# Patient Record
Sex: FEMALE | Race: WHITE | Employment: UNEMPLOYED | URBAN - METROPOLITAN AREA
[De-identification: names, ages, dates, MRNs, and addresses within clinical notes are randomized per-mention and may not be internally consistent; named-entity substitution may affect disease eponyms.]

---

## 2024-05-15 LAB
ANTIBODY SCREEN OB: NEGATIVE
RH FACTOR OB: POSITIVE
RH FACTOR OB: POSITIVE

## 2024-06-17 LAB
HEPATITIS B SURFACE ANTIGEN OB: NEGATIVE
HIV RESULT OB: NEGATIVE

## 2024-07-26 ENCOUNTER — HOSPITAL ENCOUNTER (OUTPATIENT)
Facility: HOSPITAL | Age: 39
Discharge: HOME | End: 2024-07-26
Attending: OBSTETRICS & GYNECOLOGY | Admitting: OBSTETRICS & GYNECOLOGY
Payer: COMMERCIAL

## 2024-07-26 VITALS
DIASTOLIC BLOOD PRESSURE: 66 MMHG | HEIGHT: 63 IN | RESPIRATION RATE: 16 BRPM | BODY MASS INDEX: 37.39 KG/M2 | SYSTOLIC BLOOD PRESSURE: 121 MMHG | WEIGHT: 211 LBS | HEART RATE: 81 BPM | TEMPERATURE: 99 F

## 2024-07-26 LAB
BILIRUBIN URINE: NEGATIVE
BLOOD URINE: NEGATIVE
CONTROL RUN WITHIN 24 HOURS?: YES
GLUCOSE URINE: NEGATIVE
KETONE URINE: 15
LEUKOCYTE ESTERASE URINE: NEGATIVE
NITRITE URINE: NEGATIVE
PH URINE: 7 (ref 5–8)
PROTEIN URINE: NEGATIVE
SPEC GRAVITY: 1.01 (ref 1–1.03)
URINE CLARITY: CLEAR
URINE COLOR: YELLOW
UROBILINOGEN URINE: 0.2

## 2024-07-26 PROCEDURE — 99203 OFFICE O/P NEW LOW 30 MIN: CPT

## 2024-07-26 PROCEDURE — 81002 URINALYSIS NONAUTO W/O SCOPE: CPT

## 2024-07-26 RX ORDER — CHOLECALCIFEROL (VITAMIN D3) 25 MCG
1 TABLET,CHEWABLE ORAL DAILY
COMMUNITY

## 2024-07-26 RX ORDER — FERROUS SULFATE 325(65) MG
325 TABLET, DELAYED RELEASE (ENTERIC COATED) ORAL EVERY OTHER DAY
COMMUNITY

## 2024-07-27 NOTE — NST
Nonstress Test   Patient: Mechelle James    Gestation: 27w2d    NST:       Variability: Moderate           Accelerations: Yes           Decelerations: Variable                        Uterine Irritability: Yes           Contractions: Not present                                        Acoustic Stimulator: No           Nonstress Test Interpretation: Appropriate for gestational age           Nonstress Test Second Interpretation: Appropriate for gestational age                     Additional Comments

## 2024-07-27 NOTE — PROGRESS NOTES
Pt is a 39 year old female admitted to TRG2/TRG2-A.     Chief Complaint   Patient presents with    Mva/fall/trauma      Pt is  27w2d intra-uterine pregnancy.  History obtained, consents signed. Oriented to room, staff, and plan of care. Pt c/o fall at approximately 1700. States she was playing with her kids at the playground, reached out to stop her daughter from falling and fell onto her shins. Denies abdominal trauma. Began having some mild, intermittent right-sided abdominal cramping around 1800. Denies vaginal bleeding, LOF. Bruising noted medially on left lower leg. Pt reports pain with weight bearing on that side. No other complaints.     EFM tested and applied, FHTs tracing and audible in 150s. Abdomen soft and non-tender.

## 2024-07-27 NOTE — DISCHARGE INSTRUCTIONS
Discharge Instructions    Diet: Regular  Activity: Normal activity         General Instructions    Call your OB doctor if: Fluid leaking from your vagina;Uterine contractions 10 minutes or closer for 1 to 2 hours;Uterine contractions increasing in intensity and frequency;Decrease in fetal movement;Vaginal bleeding;Temperature greater than 100F;Vaginal or rectal pressure     Follow up at next scheduled appointment

## 2024-07-27 NOTE — PROGRESS NOTES
Discharge instructions given and explained, pt verbalizes understanding and agrees. Aware she is to f/u at next scheduled appointment. Instructed to go to ED if swelling/pain in leg worsens. Pt discharged home via wc accompanied by family member.

## 2024-09-24 ENCOUNTER — HOSPITAL ENCOUNTER (OUTPATIENT)
Facility: HOSPITAL | Age: 39
Discharge: HOME | End: 2024-09-25
Attending: STUDENT IN AN ORGANIZED HEALTH CARE EDUCATION/TRAINING PROGRAM | Admitting: STUDENT IN AN ORGANIZED HEALTH CARE EDUCATION/TRAINING PROGRAM
Payer: COMMERCIAL

## 2024-09-24 VITALS — TEMPERATURE: 98 F | SYSTOLIC BLOOD PRESSURE: 112 MMHG | DIASTOLIC BLOOD PRESSURE: 63 MMHG | HEART RATE: 74 BPM

## 2024-09-24 LAB
APTT PPP: 26.4 SECONDS (ref 23–36)
BASOPHILS # BLD AUTO: 0.03 X10(3) UL (ref 0–0.2)
BASOPHILS NFR BLD AUTO: 0.2 %
EOSINOPHIL # BLD AUTO: 0.11 X10(3) UL (ref 0–0.7)
EOSINOPHIL NFR BLD AUTO: 0.9 %
ERYTHROCYTE [DISTWIDTH] IN BLOOD BY AUTOMATED COUNT: 13.3 %
FIBRINOGEN PPP-MCNC: 400 MG/DL (ref 200–480)
HCT VFR BLD AUTO: 32.2 %
HGB BLD-MCNC: 11.5 G/DL
IMM GRANULOCYTES # BLD AUTO: 0.08 X10(3) UL (ref 0–1)
IMM GRANULOCYTES NFR BLD: 0.7 %
INR BLD: 1.03 (ref 0.8–1.2)
LYMPHOCYTES # BLD AUTO: 1.85 X10(3) UL (ref 1–4)
LYMPHOCYTES NFR BLD AUTO: 15.2 %
MCH RBC QN AUTO: 30.8 PG (ref 26–34)
MCHC RBC AUTO-ENTMCNC: 35.7 G/DL (ref 31–37)
MCV RBC AUTO: 86.3 FL
MONOCYTES # BLD AUTO: 0.68 X10(3) UL (ref 0.1–1)
MONOCYTES NFR BLD AUTO: 5.6 %
NEUTROPHILS # BLD AUTO: 9.41 X10 (3) UL (ref 1.5–7.7)
NEUTROPHILS # BLD AUTO: 9.41 X10(3) UL (ref 1.5–7.7)
NEUTROPHILS NFR BLD AUTO: 77.4 %
PLATELET # BLD AUTO: 192 10(3)UL (ref 150–450)
PROTHROMBIN TIME: 13.5 SECONDS (ref 11.6–14.8)
RBC # BLD AUTO: 3.73 X10(6)UL
WBC # BLD AUTO: 12.2 X10(3) UL (ref 4–11)

## 2024-09-24 PROCEDURE — 85384 FIBRINOGEN ACTIVITY: CPT | Performed by: STUDENT IN AN ORGANIZED HEALTH CARE EDUCATION/TRAINING PROGRAM

## 2024-09-24 PROCEDURE — 85460 HEMOGLOBIN FETAL: CPT | Performed by: STUDENT IN AN ORGANIZED HEALTH CARE EDUCATION/TRAINING PROGRAM

## 2024-09-24 PROCEDURE — 85610 PROTHROMBIN TIME: CPT | Performed by: STUDENT IN AN ORGANIZED HEALTH CARE EDUCATION/TRAINING PROGRAM

## 2024-09-24 PROCEDURE — 36415 COLL VENOUS BLD VENIPUNCTURE: CPT

## 2024-09-24 PROCEDURE — 85730 THROMBOPLASTIN TIME PARTIAL: CPT | Performed by: STUDENT IN AN ORGANIZED HEALTH CARE EDUCATION/TRAINING PROGRAM

## 2024-09-24 PROCEDURE — 85025 COMPLETE CBC W/AUTO DIFF WBC: CPT | Performed by: STUDENT IN AN ORGANIZED HEALTH CARE EDUCATION/TRAINING PROGRAM

## 2024-09-24 PROCEDURE — 86701 HIV-1ANTIBODY: CPT | Performed by: STUDENT IN AN ORGANIZED HEALTH CARE EDUCATION/TRAINING PROGRAM

## 2024-09-25 LAB
HIV 1+2 AB+HIV1 P24 AG SERPL QL IA: NONREACTIVE
KLEIHAUER BETKE RESULT: NEGATIVE

## 2024-09-25 PROCEDURE — 59025 FETAL NON-STRESS TEST: CPT

## 2024-09-25 PROCEDURE — 99213 OFFICE O/P EST LOW 20 MIN: CPT

## 2024-09-25 NOTE — PROGRESS NOTES
39 year old  at 35.6 weeks gestation presents to triage with c/o falling earlier tonight.  Pt states that she initially fell on her knees but then hit her abdomen when she fell forward.  Pt reports good fetal movement and denies vaginal bleeding or leaking of fluid.  Pt denies any contractions other than usual sydney hale.  Plan of care for triage discussed with patient.  Pt with band aids on elbows and knees but denies any need for pain medication at this time.

## 2024-09-25 NOTE — NST
Nonstress Test   Patient: Mechelle James    Gestation: 36w0d    NST:       Variability: Moderate           Accelerations: Yes           Decelerations: None            Baseline: 130 BPM           Uterine Irritability: Yes           Contractions: Irregular                                        Acoustic Stimulator: No           Nonstress Test Interpretation: Reactive                                 Additional Comments

## 2024-09-25 NOTE — DISCHARGE INSTRUCTIONS
Discharge Instructions    Diet: regular  Activity: Normal activity         General Instructions    Call your OB doctor if: Fluid leaking from your vagina;Uterine contractions 10 minutes or closer for 1 to 2 hours;Uterine contractions increasing in intensity and frequency;Decrease in fetal movement;Vaginal bleeding;Temperature greater than 100F;Vaginal or rectal pressure  Early labor comfort measures: Relax, sleep, take a warm bath or shower for 30 minutes or less;Drink fluids and eat small light meals;Take a walk      Follow up with your next scheduled visit  Call with further questions or concerns

## 2024-09-25 NOTE — PROGRESS NOTES
Pt given both written and verbal discharge instructions.  Pt verbalized understanding and is in agreement of plan of care.  Pt discharged home with spouse in stable condition.  Pt has appt today at 10am with her OB and will return for that.

## 2024-10-22 ENCOUNTER — HOSPITAL ENCOUNTER (INPATIENT)
Facility: HOSPITAL | Age: 39
LOS: 1 days | Discharge: HOME | End: 2024-10-23
Attending: STUDENT IN AN ORGANIZED HEALTH CARE EDUCATION/TRAINING PROGRAM | Admitting: STUDENT IN AN ORGANIZED HEALTH CARE EDUCATION/TRAINING PROGRAM
Payer: COMMERCIAL

## 2024-10-22 ENCOUNTER — ANESTHESIA EVENT (OUTPATIENT)
Dept: OBGYN UNIT | Facility: HOSPITAL | Age: 39
End: 2024-10-22
Payer: COMMERCIAL

## 2024-10-22 ENCOUNTER — ANESTHESIA (OUTPATIENT)
Dept: OBGYN UNIT | Facility: HOSPITAL | Age: 39
End: 2024-10-22
Payer: COMMERCIAL

## 2024-10-22 ENCOUNTER — APPOINTMENT (OUTPATIENT)
Dept: OBGYN CLINIC | Facility: HOSPITAL | Age: 39
End: 2024-10-22
Payer: COMMERCIAL

## 2024-10-22 PROBLEM — Z34.90 PREGNANCY (HCC): Status: ACTIVE | Noted: 2024-10-22

## 2024-10-22 LAB
ANION GAP SERPL CALC-SCNC: 9 MMOL/L (ref 0–18)
ANTIBODY SCREEN: NEGATIVE
BASOPHILS # BLD AUTO: 0.03 X10(3) UL (ref 0–0.2)
BASOPHILS NFR BLD AUTO: 0.3 %
BUN BLD-MCNC: 6 MG/DL (ref 9–23)
CALCIUM BLD-MCNC: 9.1 MG/DL (ref 8.7–10.4)
CHLORIDE SERPL-SCNC: 108 MMOL/L (ref 98–112)
CO2 SERPL-SCNC: 19 MMOL/L (ref 21–32)
CREAT BLD-MCNC: 0.53 MG/DL
EGFRCR SERPLBLD CKD-EPI 2021: 121 ML/MIN/1.73M2 (ref 60–?)
EOSINOPHIL # BLD AUTO: 0.06 X10(3) UL (ref 0–0.7)
EOSINOPHIL NFR BLD AUTO: 0.6 %
ERYTHROCYTE [DISTWIDTH] IN BLOOD BY AUTOMATED COUNT: 13.1 %
GLUCOSE BLD-MCNC: 145 MG/DL (ref 70–99)
GLUCOSE BLD-MCNC: 65 MG/DL (ref 70–99)
GLUCOSE BLD-MCNC: 97 MG/DL (ref 70–99)
HCT VFR BLD AUTO: 35.2 %
HGB BLD-MCNC: 12.4 G/DL
IMM GRANULOCYTES # BLD AUTO: 0.07 X10(3) UL (ref 0–1)
IMM GRANULOCYTES NFR BLD: 0.6 %
LYMPHOCYTES # BLD AUTO: 1.38 X10(3) UL (ref 1–4)
LYMPHOCYTES NFR BLD AUTO: 12.7 %
MCH RBC QN AUTO: 30.7 PG (ref 26–34)
MCHC RBC AUTO-ENTMCNC: 35.2 G/DL (ref 31–37)
MCV RBC AUTO: 87.1 FL
MONOCYTES # BLD AUTO: 0.55 X10(3) UL (ref 0.1–1)
MONOCYTES NFR BLD AUTO: 5.1 %
NEUTROPHILS # BLD AUTO: 8.74 X10 (3) UL (ref 1.5–7.7)
NEUTROPHILS # BLD AUTO: 8.74 X10(3) UL (ref 1.5–7.7)
NEUTROPHILS NFR BLD AUTO: 80.7 %
OSMOLALITY SERPL CALC.SUM OF ELEC: 282 MOSM/KG (ref 275–295)
PLATELET # BLD AUTO: 190 10(3)UL (ref 150–450)
POTASSIUM SERPL-SCNC: 3.4 MMOL/L (ref 3.5–5.1)
RBC # BLD AUTO: 4.04 X10(6)UL
RH BLOOD TYPE: POSITIVE
RH BLOOD TYPE: POSITIVE
SODIUM SERPL-SCNC: 136 MMOL/L (ref 136–145)
T PALLIDUM AB SER QL IA: NONREACTIVE
WBC # BLD AUTO: 10.8 X10(3) UL (ref 4–11)

## 2024-10-22 PROCEDURE — 82962 GLUCOSE BLOOD TEST: CPT

## 2024-10-22 PROCEDURE — 86780 TREPONEMA PALLIDUM: CPT | Performed by: STUDENT IN AN ORGANIZED HEALTH CARE EDUCATION/TRAINING PROGRAM

## 2024-10-22 PROCEDURE — 86901 BLOOD TYPING SEROLOGIC RH(D): CPT | Performed by: STUDENT IN AN ORGANIZED HEALTH CARE EDUCATION/TRAINING PROGRAM

## 2024-10-22 PROCEDURE — 0KQM0ZZ REPAIR PERINEUM MUSCLE, OPEN APPROACH: ICD-10-PCS | Performed by: STUDENT IN AN ORGANIZED HEALTH CARE EDUCATION/TRAINING PROGRAM

## 2024-10-22 PROCEDURE — 86900 BLOOD TYPING SEROLOGIC ABO: CPT | Performed by: STUDENT IN AN ORGANIZED HEALTH CARE EDUCATION/TRAINING PROGRAM

## 2024-10-22 PROCEDURE — 10907ZC DRAINAGE OF AMNIOTIC FLUID, THERAPEUTIC FROM PRODUCTS OF CONCEPTION, VIA NATURAL OR ARTIFICIAL OPENING: ICD-10-PCS | Performed by: STUDENT IN AN ORGANIZED HEALTH CARE EDUCATION/TRAINING PROGRAM

## 2024-10-22 PROCEDURE — 85025 COMPLETE CBC W/AUTO DIFF WBC: CPT | Performed by: STUDENT IN AN ORGANIZED HEALTH CARE EDUCATION/TRAINING PROGRAM

## 2024-10-22 PROCEDURE — 86850 RBC ANTIBODY SCREEN: CPT | Performed by: STUDENT IN AN ORGANIZED HEALTH CARE EDUCATION/TRAINING PROGRAM

## 2024-10-22 PROCEDURE — 3E033VJ INTRODUCTION OF OTHER HORMONE INTO PERIPHERAL VEIN, PERCUTANEOUS APPROACH: ICD-10-PCS | Performed by: STUDENT IN AN ORGANIZED HEALTH CARE EDUCATION/TRAINING PROGRAM

## 2024-10-22 PROCEDURE — 80048 BASIC METABOLIC PNL TOTAL CA: CPT | Performed by: STUDENT IN AN ORGANIZED HEALTH CARE EDUCATION/TRAINING PROGRAM

## 2024-10-22 RX ORDER — AMMONIA INHALANTS 0.04 G/.3ML
0.3 INHALANT RESPIRATORY (INHALATION) AS NEEDED
Status: DISCONTINUED | OUTPATIENT
Start: 2024-10-22 | End: 2024-10-23

## 2024-10-22 RX ORDER — IBUPROFEN 600 MG/1
600 TABLET, FILM COATED ORAL ONCE AS NEEDED
Status: DISCONTINUED | OUTPATIENT
Start: 2024-10-22 | End: 2024-10-22

## 2024-10-22 RX ORDER — IBUPROFEN 600 MG/1
600 TABLET, FILM COATED ORAL EVERY 6 HOURS
Status: DISCONTINUED | OUTPATIENT
Start: 2024-10-22 | End: 2024-10-23

## 2024-10-22 RX ORDER — DOCUSATE SODIUM 100 MG/1
100 CAPSULE, LIQUID FILLED ORAL
Status: DISCONTINUED | OUTPATIENT
Start: 2024-10-22 | End: 2024-10-23

## 2024-10-22 RX ORDER — BISACODYL 10 MG
10 SUPPOSITORY, RECTAL RECTAL ONCE AS NEEDED
Status: DISCONTINUED | OUTPATIENT
Start: 2024-10-22 | End: 2024-10-23

## 2024-10-22 RX ORDER — BUPIVACAINE HYDROCHLORIDE 2.5 MG/ML
30 INJECTION, SOLUTION EPIDURAL; INFILTRATION; INTRACAUDAL AS NEEDED
Status: DISCONTINUED | OUTPATIENT
Start: 2024-10-22 | End: 2024-10-22

## 2024-10-22 RX ORDER — TERBUTALINE SULFATE 1 MG/ML
0.25 INJECTION, SOLUTION SUBCUTANEOUS AS NEEDED
Status: DISCONTINUED | OUTPATIENT
Start: 2024-10-22 | End: 2024-10-22

## 2024-10-22 RX ORDER — LIDOCAINE HYDROCHLORIDE AND EPINEPHRINE 15; 5 MG/ML; UG/ML
INJECTION, SOLUTION EPIDURAL AS NEEDED
Status: DISCONTINUED | OUTPATIENT
Start: 2024-10-22 | End: 2024-10-22 | Stop reason: SURG

## 2024-10-22 RX ORDER — CITRIC ACID/SODIUM CITRATE 334-500MG
30 SOLUTION, ORAL ORAL AS NEEDED
Status: DISCONTINUED | OUTPATIENT
Start: 2024-10-22 | End: 2024-10-22

## 2024-10-22 RX ORDER — SODIUM CHLORIDE 9 MG/ML
10 INJECTION, SOLUTION INTRAMUSCULAR; INTRAVENOUS; SUBCUTANEOUS AS NEEDED
Status: DISCONTINUED | OUTPATIENT
Start: 2024-10-22 | End: 2024-10-22

## 2024-10-22 RX ORDER — ACETAMINOPHEN 500 MG
1000 TABLET ORAL EVERY 6 HOURS PRN
Status: DISCONTINUED | OUTPATIENT
Start: 2024-10-22 | End: 2024-10-22

## 2024-10-22 RX ORDER — ACETAMINOPHEN 500 MG
500 TABLET ORAL EVERY 6 HOURS PRN
Status: DISCONTINUED | OUTPATIENT
Start: 2024-10-22 | End: 2024-10-22

## 2024-10-22 RX ORDER — ACETAMINOPHEN 500 MG
1000 TABLET ORAL EVERY 6 HOURS PRN
Status: DISCONTINUED | OUTPATIENT
Start: 2024-10-22 | End: 2024-10-23

## 2024-10-22 RX ORDER — SIMETHICONE 80 MG
80 TABLET,CHEWABLE ORAL 3 TIMES DAILY PRN
Status: DISCONTINUED | OUTPATIENT
Start: 2024-10-22 | End: 2024-10-23

## 2024-10-22 RX ORDER — ACETAMINOPHEN 500 MG
500 TABLET ORAL EVERY 6 HOURS PRN
Status: DISCONTINUED | OUTPATIENT
Start: 2024-10-22 | End: 2024-10-23

## 2024-10-22 RX ORDER — LIDOCAINE HYDROCHLORIDE 20 MG/ML
5 INJECTION, SOLUTION EPIDURAL; INFILTRATION; INTRACAUDAL; PERINEURAL AS NEEDED
Status: DISCONTINUED | OUTPATIENT
Start: 2024-10-22 | End: 2024-10-22

## 2024-10-22 RX ORDER — SODIUM CHLORIDE, SODIUM LACTATE, POTASSIUM CHLORIDE, CALCIUM CHLORIDE 600; 310; 30; 20 MG/100ML; MG/100ML; MG/100ML; MG/100ML
INJECTION, SOLUTION INTRAVENOUS CONTINUOUS
Status: DISCONTINUED | OUTPATIENT
Start: 2024-10-22 | End: 2024-10-22

## 2024-10-22 RX ORDER — BUPIVACAINE HCL/0.9 % NACL/PF 0.25 %
5 PLASTIC BAG, INJECTION (ML) EPIDURAL AS NEEDED
Status: DISCONTINUED | OUTPATIENT
Start: 2024-10-22 | End: 2024-10-22

## 2024-10-22 RX ORDER — LIDOCAINE HYDROCHLORIDE AND EPINEPHRINE 15; 5 MG/ML; UG/ML
5 INJECTION, SOLUTION EPIDURAL AS NEEDED
Status: DISCONTINUED | OUTPATIENT
Start: 2024-10-22 | End: 2024-10-22

## 2024-10-22 RX ORDER — ONDANSETRON 2 MG/ML
4 INJECTION INTRAMUSCULAR; INTRAVENOUS EVERY 6 HOURS PRN
Status: DISCONTINUED | OUTPATIENT
Start: 2024-10-22 | End: 2024-10-22

## 2024-10-22 RX ORDER — DEXTROSE, SODIUM CHLORIDE, SODIUM LACTATE, POTASSIUM CHLORIDE, AND CALCIUM CHLORIDE 5; .6; .31; .03; .02 G/100ML; G/100ML; G/100ML; G/100ML; G/100ML
INJECTION, SOLUTION INTRAVENOUS AS NEEDED
Status: DISCONTINUED | OUTPATIENT
Start: 2024-10-22 | End: 2024-10-22

## 2024-10-22 RX ORDER — NALBUPHINE HYDROCHLORIDE 10 MG/ML
2.5 INJECTION INTRAMUSCULAR; INTRAVENOUS; SUBCUTANEOUS
Status: DISCONTINUED | OUTPATIENT
Start: 2024-10-22 | End: 2024-10-22

## 2024-10-22 RX ADMIN — LIDOCAINE HYDROCHLORIDE AND EPINEPHRINE 5 ML: 15; 5 INJECTION, SOLUTION EPIDURAL at 15:19:00

## 2024-10-22 NOTE — ANESTHESIA PROCEDURE NOTES
Labor Analgesia    Date/Time: 10/22/2024 3:13 PM    Performed by: Ilana Han MD  Authorized by: Ilana Han MD      General Information and Staff    Start Time:  10/22/2024 3:13 PM  End Time:  10/22/2024 3:19 PM  Anesthesiologist:  Ilana Han MD  Performed by:  Anesthesiologist  Patient Location:  OB  Site Identification: surface landmarks    Reason for Block: labor epidural    Preanesthetic Checklist: patient identified, IV checked, risks and benefits discussed, monitors and equipment checked, pre-op evaluation, timeout performed, IV bolus, anesthesia consent and sterile technique used      Procedure Details    Patient Position:  Sitting  Prep: ChloraPrep    Monitoring:  Heart rate and continuous pulse ox  Approach:  Midline    Epidural Needle    Injection Technique:   Needle Type:  Tuohy  Needle Gauge:  17 G  Needle Length:  3.375 in  Needle Insertion Depth:  5    Spinal Needle      Catheter    Catheter Type:  End hole  Catheter Size:  19 G  Catheter at Skin Depth:  12  Test Dose:  Negative    Assessment    Sensory Level:  T10    Additional Comments     Patient has history of failed epidurals x 3  One attempt, uncomplicated, did take extra time to get comfortable but comfort achieved

## 2024-10-22 NOTE — ANESTHESIA PREPROCEDURE EVALUATION
PRE-OP EVALUATION    Patient Name: Mechelle James    Admit Diagnosis: Pregnancy (HCC) [Z34.90]    Pre-op Diagnosis: * No pre-op diagnosis entered *        Anesthesia Procedure: LABOR ANALGESIA    * No surgeons found in log *    Pre-op vitals reviewed.  Temp: 98.4 °F (36.9 °C)  Pulse: 70  Resp: 16  BP: 129/79     Body mass index is 37.38 kg/m².    Current medications reviewed.  Hospital Medications:  • lactated ringers infusion   Intravenous Continuous   • dextrose in lactated ringers 5% infusion   Intravenous PRN   • lactated ringers IV bolus 500 mL  500 mL Intravenous PRN   • acetaminophen (Tylenol Extra Strength) tab 500 mg  500 mg Oral Q6H PRN   • acetaminophen (Tylenol Extra Strength) tab 1,000 mg  1,000 mg Oral Q6H PRN   • ibuprofen (Motrin) tab 600 mg  600 mg Oral Once PRN   • ondansetron (Zofran) 4 MG/2ML injection 4 mg  4 mg Intravenous Q6H PRN   • oxyTOCIN in sodium chloride 0.9% (Pitocin) 30 Units/500mL infusion premix  62.5-900 nuzhat-units/min Intravenous Continuous   • terbutaline (Brethine) 1 MG/ML injection 0.25 mg  0.25 mg Subcutaneous PRN   • sodium citrate-citric acid (Bicitra) 500-334 MG/5ML oral solution 30 mL  30 mL Oral PRN   • oxyTOCIN in sodium chloride 0.9% (Pitocin) 30 Units/500mL infusion premix  0.5-20 nuzhat-units/min Intravenous Continuous   • lactated ringers IV bolus 1,000 mL  1,000 mL Intravenous Once   • fentaNYL-bupivacaine 2 mcg/mL-0.125% in sodium chloride 0.9% 100 mL EPIDURAL infusion premix  12 mL/hr Epidural Continuous   • fentaNYL (Sublimaze) 50 mcg/mL injection 100 mcg  100 mcg Epidural Once   • lidocaine 1.5%-EPINEPHrine 1:200,000 (Xylocaine-Epinephrine) injection  5 mL Injection PRN   • bupivacaine PF (Marcaine) 0.25% injection  30 mL Injection PRN   • lidocaine PF (Xylocaine-MPF) 2% injection  5 mL Injection PRN   • sodium chloride 0.9% PF injection 10 mL  10 mL Injection PRN   • EPHEDrine (PF) 25 MG/5 ML injection 5 mg  5 mg Intravenous PRN   • nalbuphine (Nubain) 10  mg/mL injection 2.5 mg  2.5 mg Intravenous Q15 Min PRN       Outpatient Medications:   Prescriptions Prior to Admission[1]    Allergies: Patient has no known allergies.      Anesthesia Evaluation    Patient summary reviewed.    Anesthetic Complications  (-) history of anesthetic complications         GI/Hepatic/Renal    Negative GI/hepatic/renal ROS.                             Cardiovascular        Exercise tolerance: good     MET: >4      (+) hypertension                                     Endo/Other      (+) diabetes  gestational,                          Pulmonary    Negative pulmonary ROS.                       Neuro/Psych    Negative neuro/psych ROS.                              Past Surgical History:   Procedure Laterality Date   • Anesth,biceps tendon repair Right 2023   • Cholecystectomy  2016   • Excis uterine fibroid,abd apprch  2015   • Knee arthroscopy Bilateral     Age 16   • Repair anter tibial tubercle Bilateral     Age 18   • Rotator cuff repair Right 2023     Social History     Socioeconomic History   • Marital status: Single   Tobacco Use   • Smoking status: Never   • Smokeless tobacco: Never   Vaping Use   • Vaping status: Never Used   Substance and Sexual Activity   • Alcohol use: Not Currently   • Drug use: Never     History   Drug Use Unknown     Available pre-op labs reviewed.  Lab Results   Component Value Date    WBC 10.8 10/22/2024    RBC 4.04 10/22/2024    HGB 12.4 10/22/2024    HCT 35.2 10/22/2024    MCV 87.1 10/22/2024    MCH 30.7 10/22/2024    MCHC 35.2 10/22/2024    RDW 13.1 10/22/2024    .0 10/22/2024     Lab Results   Component Value Date     10/22/2024    K 3.4 (L) 10/22/2024     10/22/2024    CO2 19.0 (L) 10/22/2024    BUN 6 (L) 10/22/2024    CREATSERUM 0.53 (L) 10/22/2024     (H) 10/22/2024    CA 9.1 10/22/2024            Airway      Mallampati: II  Mouth opening: >3 FB  TM distance: > 6 cm  Neck ROM: full Cardiovascular    Cardiovascular exam  normal.         Dental    Dentition appears grossly intact         Pulmonary    Pulmonary exam normal.                 Other findings        ASA: 3   Plan: epidural   patient meets guidelines.    Post-procedure pain management plan discussed with surgeon and patient.      Plan/risks discussed with: patient            Present on Admission:  **None**             [1]   Medications Prior to Admission   Medication Sig Dispense Refill Last Dose/Taking   • docusate sodium 50 MG Oral Cap Take 1 capsule (50 mg total) by mouth 2 (two) times daily.   10/21/2024   • prenatal vitamin with DHA 27-0.8-228 MG Oral Cap Take 1 capsule by mouth daily.   10/21/2024   • ferrous sulfate 325 (65 FE) MG Oral Tab EC Take 1 tablet (325 mg total) by mouth every other day.   10/21/2024

## 2024-10-22 NOTE — PLAN OF CARE
Problem: BIRTH - VAGINAL/ SECTION  Goal: Fetal and maternal status remain reassuring during the birth process  Description: INTERVENTIONS:  - Monitor vital signs  - Monitor fetal heart rate  - Monitor uterine activity  - Monitor labor progression (vaginal delivery)  - DVT prophylaxis (C/S delivery)  - Surgical antibiotic prophylaxis (C/S delivery)  Outcome: Completed     Problem: PAIN - ADULT  Goal: Verbalizes/displays adequate comfort level or patient's stated pain goal  Description: INTERVENTIONS:  - Encourage pt to monitor pain and request assistance  - Assess pain using appropriate pain scale  - Administer analgesics based on type and severity of pain and evaluate response  - Implement non-pharmacological measures as appropriate and evaluate response  - Consider cultural and social influences on pain and pain management  - Manage/alleviate anxiety  - Utilize distraction and/or relaxation techniques  - Monitor for opioid side effects  - Notify MD/LIP if interventions unsuccessful or patient reports new pain  - Anticipate increased pain with activity and pre-medicate as appropriate  Outcome: Completed     Problem: ANXIETY  Goal: Will report anxiety at manageable levels  Description: INTERVENTIONS:  - Administer medication as ordered  - Teach and rehearse alternative coping skills  - Provide emotional support with 1:1 interaction with staff  Outcome: Completed     Problem: Patient/Family Goals  Goal: Patient/Family Long Term Goal  Description: Patient's Long Term Goal:     Interventions:Uncomplicated vaginal delivery  -   - See additional Care Plan goals for specific interventions  Outcome: Completed  Goal: Patient/Family Short Term Goal  Description: Patient's Short Term Goal: Adequate pain control    Interventions:   -   - See additional Care Plan goals for specific interventions  Outcome: Completed

## 2024-10-22 NOTE — H&P
[facility]  History & Physical    Mechelle James Patient Status:  Inpatient    1985 MRN [id number]   Location [facility] LABOR & DELIVERY Attending Candy Siu MD   Hosp Day # 0 PCP No primary care provider on file.     SUBJECTIVE:    Mechelle James is a 39 year old  female at 39+6 weeks gestation who is being admitted for induction of labor for ama/a1gdm.  Her current obstetrical history is significant for     1) AMA and obseity  -NST/JENNIFER weekly  2) GDM A1  3) H/o preeclampsia with first pregnancy  4) GBS neg     Obstetric History:   OB History    Para Term  AB Living   4 3 3     3   SAB IAB Ectopic Multiple Live Births           3      # Outcome Date GA Lbr Gary/2nd Weight Sex Type Anes PTL Lv   4 Current            3 Term 10/28/19 39w0d   F NORMAL SPONT   LILLIAM      Complications: Other - see comments   2 Term 18 39w0d   F NORMAL SPONT   LILLIAM   1 Term 07/10/16 41w0d   F NORMAL SPONT   LILLIAM      Complications: Preeclampsia     Past Medical History:   Past Medical History:    Endometriosis    Gestational diabetes (HCC)    Preeclampsia (HCC)    Pregnancy-induced hypertension (HCC)     Past Social History:   Past Surgical History:   Procedure Laterality Date    Anesth,biceps tendon repair Right     Cholecystectomy  2016    Excis uterine fibroid,abd apprch  2015    Knee arthroscopy Bilateral     Age 16    Repair anter tibial tubercle Bilateral     Age 18    Rotator cuff repair Right      Family History: History reviewed. No pertinent family history.  Social History:   Social History     Tobacco Use    Smoking status: Never    Smokeless tobacco: Never   Substance Use Topics    Alcohol use: Not Currently       Home Meds: Prescriptions Prior to Admission[1]  Allergies: Allergies[2]    OBJECTIVE:    Temp:  [98 °F (36.7 °C)] 98 °F (36.7 °C)  Pulse:  [70] 70  Resp:  [17] 17  BP: (134)/(81) 134/81    Lungs:   nonlabored   Heart:   Regular rate   Abdomen: +gravid    Fetal Surveillance:  145/mod/+accels/-decels   TOCO irregular      Cervix: In clinic 3     Lab Review:  Lab Results   Component Value Date    WBC 10.8 10/22/2024    HGB 12.4 10/22/2024    HCT 35.2 10/22/2024    .0 10/22/2024    CREATSERUM 0.53 10/22/2024    BUN 6 10/22/2024     10/22/2024    K 3.4 10/22/2024     10/22/2024    CO2 19.0 10/22/2024     10/22/2024    CA 9.1 10/22/2024       GBS negative       ASSESSMENT:  Mechelle James is a 39 year old  female at 39+6 weeks gestation who is being admitted for induction of labor for ama/a1gdm.       PLAN:  -admit to labor and delivery  -IVF  -cbc, T&S, admit accu 97  -labor: on pitocine  -GBS negative  -epidural PRN      Candy Siu MD  10/22/2024  10:52 AM         [1]   Medications Prior to Admission   Medication Sig Dispense Refill Last Dose/Taking    docusate sodium 50 MG Oral Cap Take 1 capsule (50 mg total) by mouth 2 (two) times daily.   10/21/2024    prenatal vitamin with DHA 27-0.8-228 MG Oral Cap Take 1 capsule by mouth daily.   10/21/2024    ferrous sulfate 325 (65 FE) MG Oral Tab EC Take 1 tablet (325 mg total) by mouth every other day.   10/21/2024   [2] No Known Allergies

## 2024-10-22 NOTE — PLAN OF CARE
Problem: BIRTH - VAGINAL/ SECTION  Goal: Fetal and maternal status remain reassuring during the birth process  Description: INTERVENTIONS:  - Monitor vital signs  - Monitor fetal heart rate  - Monitor uterine activity  - Monitor labor progression (vaginal delivery)  - DVT prophylaxis (C/S delivery)  - Surgical antibiotic prophylaxis (C/S delivery)  10/22/2024 1018 by Tiffany Waters RN  Outcome: Progressing  10/22/2024 1017 by Tiffany Waters RN  Outcome: Progressing     Problem: PAIN - ADULT  Goal: Verbalizes/displays adequate comfort level or patient's stated pain goal  Description: INTERVENTIONS:  - Encourage pt to monitor pain and request assistance  - Assess pain using appropriate pain scale  - Administer analgesics based on type and severity of pain and evaluate response  - Implement non-pharmacological measures as appropriate and evaluate response  - Consider cultural and social influences on pain and pain management  - Manage/alleviate anxiety  - Utilize distraction and/or relaxation techniques  - Monitor for opioid side effects  - Notify MD/LIP if interventions unsuccessful or patient reports new pain  - Anticipate increased pain with activity and pre-medicate as appropriate  10/22/2024 1018 by Tiffany Waters RN  Outcome: Progressing  10/22/2024 1017 by Tiffany Waters RN  Outcome: Progressing     Problem: ANXIETY  Goal: Will report anxiety at manageable levels  Description: INTERVENTIONS:  - Administer medication as ordered  - Teach and rehearse alternative coping skills  - Provide emotional support with 1:1 interaction with staff  10/22/2024 1018 by Tiffany Waters RN  Outcome: Progressing  10/22/2024 1017 by Tiffany Waters RN  Outcome: Progressing     Problem: Patient/Family Goals  Goal: Patient/Family Long Term Goal  Description: Patient's Long Term Goal:     Interventions:Uncomplicated vaginal delivery  -   - See additional Care Plan goals for specific  interventions  10/22/2024 1018 by Tiffany Waters RN  Outcome: Progressing  10/22/2024 1017 by Tiffany Waters RN  Outcome: Progressing  Goal: Patient/Family Short Term Goal  Description: Patient's Short Term Goal: Adequate pain control    Interventions:   -   - See additional Care Plan goals for specific interventions  10/22/2024 1018 by Tiffany Waters RN  Outcome: Progressing  10/22/2024 1017 by Tiffany Waters RN  Outcome: Progressing

## 2024-10-22 NOTE — PROGRESS NOTES
Pt admitted at 0810. In to see pt at  0820, pt standing at bedside, POC discussed, pt requests for labor and delivery, pain options, delivery.   All questions answered.

## 2024-10-22 NOTE — PROGRESS NOTES
Pt is a 39 year old female admitted to 108/108-A.     Chief Complaint   Patient presents with    Scheduled Induction      Pt is  39w6d intra-uterine pregnancy.  History obtained, consents signed. Oriented to room, staff, and plan of care.

## 2024-10-22 NOTE — L&D DELIVERY NOTE
Jacob Girl [[id number]]      Labor Events     labor?: No   steroids?: None  Rupture date/time: 10/22/2024 1244     Rupture type: AROM  Fluid color: Clear  Labor type: Induced Onset of Labor  Induction: Oxytocin  Indications for induction: IDDM/GDDM  Intrapartum & labor complications: Variable decelerations       Labor Event Times    Labor onset date/time: 10/22/2024 1224  Dilation complete date/time: 10/22/2024 1803  Start pushing date/time: 10/22/2024 1810        Presentation    Presentation: Vertex  Position: Occiput Anterior       Operative Delivery    Operative Vaginal Delivery: N/A                Shoulder Dystocia    Shoulder Dystocia: N/A       Anesthesia    Method: Epidural               Delivery      Head delivery date/time: 10/22/2024 18:11:27   Delivery date/time:  10/22/24 18:11:00   Delivery type: Normal spontaneous vaginal delivery    Details:     Delivery location: delivery room       Delivery Providers    Delivering Clinician: Candy Siu MD   Delivery personnel:  Provider Role   Roya Jaime, RN Baby Nurse   Tiffany Waters, RN Delivery Nurse             Cord    Vessels: 3 Vessels  Complications: Nuchal  # of loops: 1  Timed cord clamping: Yes  Time in sec: 30  Cord blood disposition: to lab  Gases sent?: No       Resuscitation    Method: None        Measurements      Weight: 3690 g 8 lb 2.2 oz Length: 48.9 cm     Head circum.: 34.5 cm Chest circum.: 34 cm      Abdominal circum.: 34.5 cm           Placenta    Date/time: 10/22/2024 1814  Removal: Spontaneous  Appearance: Intact  Disposition: held for future pathology       Apgars    Living status: Living   Apgar Scoring Key:    0 1 2    Skin color Blue or pale Acrocyanotic Completely pink    Heart rate Absent <100 bpm >100 bpm    Reflex irritability No response Grimace Cry or active withdrawal    Muscle tone Limp Some flexion Active motion    Respiratory effort Absent Weak cry;  hypoventilation Good, crying              1 Minute:  5 Minute:  10 Minute:  15 Minute:  20 Minute:      Skin color: 1  1       Heart rate: 2  2       Reflex irritablity: 2  2       Muscle tone: 2  2       Respiratory effort: 2  2       Total: 9  9          Apgars assigned by: ABDIAZIZ   disposition: with mother       Skin to Skin    Skin to skin initiated date/time: 10/22/2024 1811  Skin to skin with: Mother       Vaginal Count    Initial count RN: Tiffany Waters RN  Initial count Tech: Stevekem Vianca   Lisette Madrid    Initial counts 11   0    Final counts 11   1    Final count RN: Tiffany Waters RN  Final count MD: Candy Siu MD       Lacerations    Episiotomy: None  Perineal lacerations: 2nd Repaired?: Yes     Vaginal laceration?: No      Cervical laceration?: No    Clitoral laceration?: No    Quantitative blood loss (mL): 118              Pre Op Dx:    IUP at 39+6  AMA  A1GDM    Post Op Dx: Same    Procedure: Normal Spontaneous Vaginal Delivery    Surgeon: Candy Siu MD    Anesthesia: Epidural    EBL: 118 mL    Findings: 1) A viable female infant with Apgars of 9 and 9 weighing 8lbs 2 oz was delivered in the RAMIREZ position. 2) 3 vessel cord. 3)Normal appearing placenta spontaneously delivered. 4)second degree laceration      Delivery Summary    Patient was complete and pushed for 1 contraction. Head delivered spontaneously, nuchal cord noted then compound hand and delivered through.  She then delivered a viable baby girl via  without difficulty. The baby was vigorous and was bulb suctioned. The baby was placed on the mother's abdomen. The cord was clamped and cut after a 30+ second delay, and cord blood was collected. The placenta delivered spontaneously. Her uterus was massaged and was firm. She had a second degree laceration repaired with 2-0 vicryl suture. She tolerated the procedure well.

## 2024-10-23 VITALS
OXYGEN SATURATION: 100 % | SYSTOLIC BLOOD PRESSURE: 134 MMHG | BODY MASS INDEX: 37.39 KG/M2 | HEIGHT: 63 IN | RESPIRATION RATE: 16 BRPM | WEIGHT: 211 LBS | HEART RATE: 68 BPM | DIASTOLIC BLOOD PRESSURE: 89 MMHG | TEMPERATURE: 98 F

## 2024-10-23 LAB
BASOPHILS # BLD AUTO: 0.06 X10(3) UL (ref 0–0.2)
BASOPHILS NFR BLD AUTO: 0.4 %
EOSINOPHIL # BLD AUTO: 0.11 X10(3) UL (ref 0–0.7)
EOSINOPHIL NFR BLD AUTO: 0.8 %
ERYTHROCYTE [DISTWIDTH] IN BLOOD BY AUTOMATED COUNT: 13.2 %
GLUCOSE BLD-MCNC: 79 MG/DL (ref 70–99)
HCT VFR BLD AUTO: 33.3 %
HGB BLD-MCNC: 11.4 G/DL
IMM GRANULOCYTES # BLD AUTO: 0.09 X10(3) UL (ref 0–1)
IMM GRANULOCYTES NFR BLD: 0.7 %
LYMPHOCYTES # BLD AUTO: 2.11 X10(3) UL (ref 1–4)
LYMPHOCYTES NFR BLD AUTO: 15.6 %
MCH RBC QN AUTO: 31.1 PG (ref 26–34)
MCHC RBC AUTO-ENTMCNC: 34.2 G/DL (ref 31–37)
MCV RBC AUTO: 90.7 FL
MONOCYTES # BLD AUTO: 1.04 X10(3) UL (ref 0.1–1)
MONOCYTES NFR BLD AUTO: 7.7 %
NEUTROPHILS # BLD AUTO: 10.08 X10 (3) UL (ref 1.5–7.7)
NEUTROPHILS # BLD AUTO: 10.08 X10(3) UL (ref 1.5–7.7)
NEUTROPHILS NFR BLD AUTO: 74.8 %
PLATELET # BLD AUTO: 166 10(3)UL (ref 150–450)
RBC # BLD AUTO: 3.67 X10(6)UL
WBC # BLD AUTO: 13.5 X10(3) UL (ref 4–11)

## 2024-10-23 PROCEDURE — 85025 COMPLETE CBC W/AUTO DIFF WBC: CPT | Performed by: STUDENT IN AN ORGANIZED HEALTH CARE EDUCATION/TRAINING PROGRAM

## 2024-10-23 PROCEDURE — 82962 GLUCOSE BLOOD TEST: CPT

## 2024-10-23 RX ORDER — IBUPROFEN 600 MG/1
600 TABLET, FILM COATED ORAL EVERY 8 HOURS PRN
Qty: 30 TABLET | Refills: 0 | Status: SHIPPED | OUTPATIENT
Start: 2024-10-23

## 2024-10-23 NOTE — PROGRESS NOTES
Patient up to bathroom with assist x 1.  Able to void 250 ml at this time. Patient transferred to mother/baby room  per wheelchair in stable condition with baby and personal belongings.  Accompanied by significant other and staff.  Report given to mother/baby RN.

## 2024-10-23 NOTE — PROGRESS NOTES
PPD #1  Pt without complaints - desires DC home  /75 (BP Location: Right arm)   Pulse 64   Temp 98 °F (36.7 °C) (Oral)   Resp 16   Ht 5' 3\" (1.6 m)   Wt 211 lb (95.7 kg)   LMP 01/17/2024   SpO2 100%   Breastfeeding Yes   BMI 37.38 kg/m²   Fundus firm at U-1  Lochia - appropriate  Extrem - NT  WBC - 13.5  Hgb  11.4  A/P Doing well - DC home - instructions given for f/u, activity and restrictions - nursing - motrin for pain

## 2024-10-23 NOTE — PROGRESS NOTES
Labor Analgesia Follow Up Note    Patient underwent epidural anesthesia for labor analgesia,    Placenta Date/Time: 10/22/2024  6:14 PM    Delivery Date/Time:: 10/22/2024  6:11 PM    /75 (BP Location: Right arm)   Pulse 64   Temp 98 °F (36.7 °C) (Oral)   Resp 16   Ht 1.6 m (5' 3\")   Wt 95.7 kg (211 lb)   LMP 01/17/2024   SpO2 100%   Breastfeeding Yes   BMI 37.38 kg/m²     Assessment:  Patient seen and no apparent anesthesia related complications.    Thank you for asking us to participate in the care of your patient.    Terrence Mullins MD  [facility]

## 2024-10-24 NOTE — PROGRESS NOTES
Pt discharged home in stable condition. Discharge instructions provided and explained thoroughly. Teal band given and purpose explained to pt. Pt verbalizes good understanding.

## 2024-10-28 ENCOUNTER — TELEPHONE (OUTPATIENT)
Dept: OBGYN UNIT | Facility: HOSPITAL | Age: 39
End: 2024-10-28